# Patient Record
Sex: MALE | URBAN - METROPOLITAN AREA
[De-identification: names, ages, dates, MRNs, and addresses within clinical notes are randomized per-mention and may not be internally consistent; named-entity substitution may affect disease eponyms.]

---

## 2020-08-13 ENCOUNTER — OFFICE VISIT (OUTPATIENT)
Dept: URBAN - METROPOLITAN AREA CLINIC 32 | Facility: CLINIC | Age: 35
End: 2020-08-13

## 2020-08-13 DIAGNOSIS — H52.13 MYOPIA, BILATERAL: Primary | ICD-10-CM

## 2020-08-13 PROCEDURE — 92310 CONTACT LENS FITTING OU: CPT | Performed by: OPTOMETRIST

## 2020-08-13 PROCEDURE — 92004 COMPRE OPH EXAM NEW PT 1/>: CPT | Performed by: OPTOMETRIST

## 2020-08-13 ASSESSMENT — VISUAL ACUITY
OD: 20/20
OS: 20/20

## 2020-08-13 ASSESSMENT — INTRAOCULAR PRESSURE
OS: 10
OD: 9

## 2020-08-13 NOTE — IMPRESSION/PLAN
Impression: Diagnosis: Myopia, bilateral. Code: H52.13. Plan: Discussed diagnosis in detail with patient. New glasses Rx was given today. New CL Rx given today. Return to clinic in 1 year/prn.

## 2020-09-24 ENCOUNTER — OFFICE VISIT (OUTPATIENT)
Dept: URBAN - METROPOLITAN AREA CLINIC 32 | Facility: CLINIC | Age: 35
End: 2020-09-24

## 2020-09-24 PROCEDURE — 92310 CONTACT LENS FITTING OU: CPT | Performed by: OPTOMETRIST

## 2025-01-21 ENCOUNTER — OFFICE VISIT (OUTPATIENT)
Dept: URBAN - METROPOLITAN AREA CLINIC 32 | Facility: CLINIC | Age: 40
End: 2025-01-21
Payer: COMMERCIAL

## 2025-01-21 DIAGNOSIS — H52.13 MYOPIA, BILATERAL: ICD-10-CM

## 2025-01-21 DIAGNOSIS — H04.123 DRY EYE SYNDROME: Primary | ICD-10-CM

## 2025-01-21 PROCEDURE — 99204 OFFICE O/P NEW MOD 45 MIN: CPT | Performed by: OPTOMETRIST

## 2025-01-21 PROCEDURE — 92310 CONTACT LENS FITTING OU: CPT | Performed by: OPTOMETRIST

## 2025-01-21 ASSESSMENT — INTRAOCULAR PRESSURE
OS: 11
OD: 10

## 2025-01-21 ASSESSMENT — KERATOMETRY
OD: 45.88
OS: 45.88

## 2025-01-21 ASSESSMENT — VISUAL ACUITY
OS: 20/20
OD: 20/20